# Patient Record
(demographics unavailable — no encounter records)

---

## 2020-01-10 NOTE — XRAY REPORT
Reason:  LEFT ANKLE PAIN

Procedure Date:  01/10/2020   

Accession Number:  [id number] / [id number]                    

Procedure:  WCP - Ankle 3 View LT CPT Code:  

 

***Final Report***

 

 

FULL RESULT:

 

 

EXAM:

LEFT ANKLE RADIOGRAPHY

 

EXAM DATE: 1/10/2020 01:56 PM.

 

CLINICAL HISTORY: LEFT ANKLE PAIN.

 

COMPARISON: None.

 

TECHNIQUE: 3 views.

 

FINDINGS:

Bones: Normal. No fractures or bone lesions.

 

Joints: Normal. No effusion. No subluxations. The ankle mortise is 

normally aligned.

 

Soft Tissues: Normal. No soft tissue swelling.

IMPRESSION: Normal ankle radiography.

 

[facility]